# Patient Record
Sex: FEMALE | Race: WHITE | Employment: OTHER | URBAN - METROPOLITAN AREA
[De-identification: names, ages, dates, MRNs, and addresses within clinical notes are randomized per-mention and may not be internally consistent; named-entity substitution may affect disease eponyms.]

---

## 2017-02-09 ENCOUNTER — HOSPITAL ENCOUNTER (OUTPATIENT)
Dept: WOMENS IMAGING | Age: 77
Discharge: HOME | End: 2017-02-09
Payer: MEDICARE

## 2017-02-09 DIAGNOSIS — Z78.0 POST-MENOPAUSAL: ICD-10-CM

## 2017-02-09 PROCEDURE — 77080 DXA BONE DENSITY AXIAL: CPT

## 2025-01-21 ENCOUNTER — APPOINTMENT (OUTPATIENT)
Dept: RADIOLOGY | Facility: HOSPITAL | Age: 85
End: 2025-01-21
Payer: MEDICARE

## 2025-01-21 ENCOUNTER — HOSPITAL ENCOUNTER (EMERGENCY)
Facility: HOSPITAL | Age: 85
Discharge: HOME | End: 2025-01-21
Attending: EMERGENCY MEDICINE
Payer: MEDICARE

## 2025-01-21 ENCOUNTER — APPOINTMENT (OUTPATIENT)
Dept: CARDIOLOGY | Facility: HOSPITAL | Age: 85
End: 2025-01-21
Payer: MEDICARE

## 2025-01-21 VITALS
WEIGHT: 135 LBS | HEIGHT: 64 IN | TEMPERATURE: 96.8 F | RESPIRATION RATE: 17 BRPM | HEART RATE: 88 BPM | OXYGEN SATURATION: 94 % | SYSTOLIC BLOOD PRESSURE: 111 MMHG | DIASTOLIC BLOOD PRESSURE: 72 MMHG | BODY MASS INDEX: 23.05 KG/M2

## 2025-01-21 DIAGNOSIS — R55 SYNCOPE AND COLLAPSE: Primary | ICD-10-CM

## 2025-01-21 LAB
ALBUMIN SERPL BCP-MCNC: 4.2 G/DL (ref 3.4–5)
ALP SERPL-CCNC: 114 U/L (ref 33–136)
ALT SERPL W P-5'-P-CCNC: 11 U/L (ref 7–45)
ANION GAP SERPL CALC-SCNC: 15 MMOL/L (ref 10–20)
AST SERPL W P-5'-P-CCNC: 19 U/L (ref 9–39)
BASOPHILS # BLD AUTO: 0.02 X10*3/UL (ref 0–0.1)
BASOPHILS NFR BLD AUTO: 0.2 %
BILIRUB SERPL-MCNC: 0.9 MG/DL (ref 0–1.2)
BUN SERPL-MCNC: 16 MG/DL (ref 6–23)
CALCIUM SERPL-MCNC: 8.8 MG/DL (ref 8.6–10.3)
CARDIAC TROPONIN I PNL SERPL HS: 10 NG/L (ref 0–13)
CARDIAC TROPONIN I PNL SERPL HS: 11 NG/L (ref 0–13)
CHLORIDE SERPL-SCNC: 102 MMOL/L (ref 98–107)
CO2 SERPL-SCNC: 24 MMOL/L (ref 21–32)
CREAT SERPL-MCNC: 0.99 MG/DL (ref 0.5–1.05)
EGFRCR SERPLBLD CKD-EPI 2021: 56 ML/MIN/1.73M*2
EOSINOPHIL # BLD AUTO: 0 X10*3/UL (ref 0–0.4)
EOSINOPHIL NFR BLD AUTO: 0 %
ERYTHROCYTE [DISTWIDTH] IN BLOOD BY AUTOMATED COUNT: 14.8 % (ref 11.5–14.5)
GLUCOSE SERPL-MCNC: 166 MG/DL (ref 74–99)
HCT VFR BLD AUTO: 39.5 % (ref 36–46)
HGB BLD-MCNC: 13 G/DL (ref 12–16)
IMM GRANULOCYTES # BLD AUTO: 0.02 X10*3/UL (ref 0–0.5)
IMM GRANULOCYTES NFR BLD AUTO: 0.2 % (ref 0–0.9)
LYMPHOCYTES # BLD AUTO: 1 X10*3/UL (ref 0.8–3)
LYMPHOCYTES NFR BLD AUTO: 12.3 %
MAGNESIUM SERPL-MCNC: 2 MG/DL (ref 1.6–2.4)
MCH RBC QN AUTO: 29.4 PG (ref 26–34)
MCHC RBC AUTO-ENTMCNC: 32.9 G/DL (ref 32–36)
MCV RBC AUTO: 89 FL (ref 80–100)
MONOCYTES # BLD AUTO: 1 X10*3/UL (ref 0.05–0.8)
MONOCYTES NFR BLD AUTO: 12.3 %
NEUTROPHILS # BLD AUTO: 6.11 X10*3/UL (ref 1.6–5.5)
NEUTROPHILS NFR BLD AUTO: 75 %
NRBC BLD-RTO: 0 /100 WBCS (ref 0–0)
PLATELET # BLD AUTO: 182 X10*3/UL (ref 150–450)
POTASSIUM SERPL-SCNC: 3.8 MMOL/L (ref 3.5–5.3)
PROT SERPL-MCNC: 6.8 G/DL (ref 6.4–8.2)
RBC # BLD AUTO: 4.42 X10*6/UL (ref 4–5.2)
SODIUM SERPL-SCNC: 137 MMOL/L (ref 136–145)
WBC # BLD AUTO: 8.2 X10*3/UL (ref 4.4–11.3)

## 2025-01-21 PROCEDURE — 80053 COMPREHEN METABOLIC PANEL: CPT | Performed by: EMERGENCY MEDICINE

## 2025-01-21 PROCEDURE — 71045 X-RAY EXAM CHEST 1 VIEW: CPT

## 2025-01-21 PROCEDURE — 36415 COLL VENOUS BLD VENIPUNCTURE: CPT | Performed by: EMERGENCY MEDICINE

## 2025-01-21 PROCEDURE — 84484 ASSAY OF TROPONIN QUANT: CPT | Performed by: EMERGENCY MEDICINE

## 2025-01-21 PROCEDURE — 70450 CT HEAD/BRAIN W/O DYE: CPT

## 2025-01-21 PROCEDURE — 85025 COMPLETE CBC W/AUTO DIFF WBC: CPT | Performed by: EMERGENCY MEDICINE

## 2025-01-21 PROCEDURE — 70450 CT HEAD/BRAIN W/O DYE: CPT | Performed by: RADIOLOGY

## 2025-01-21 PROCEDURE — 99285 EMERGENCY DEPT VISIT HI MDM: CPT | Mod: 25 | Performed by: EMERGENCY MEDICINE

## 2025-01-21 PROCEDURE — 83735 ASSAY OF MAGNESIUM: CPT | Performed by: EMERGENCY MEDICINE

## 2025-01-21 PROCEDURE — 93005 ELECTROCARDIOGRAM TRACING: CPT

## 2025-01-21 PROCEDURE — 71045 X-RAY EXAM CHEST 1 VIEW: CPT | Performed by: RADIOLOGY

## 2025-01-21 ASSESSMENT — PAIN - FUNCTIONAL ASSESSMENT: PAIN_FUNCTIONAL_ASSESSMENT: 0-10

## 2025-01-21 ASSESSMENT — PAIN SCALES - GENERAL
PAINLEVEL_OUTOF10: 0 - NO PAIN
PAINLEVEL_OUTOF10: 0 - NO PAIN

## 2025-01-21 ASSESSMENT — LIFESTYLE VARIABLES
EVER FELT BAD OR GUILTY ABOUT YOUR DRINKING: NO
EVER HAD A DRINK FIRST THING IN THE MORNING TO STEADY YOUR NERVES TO GET RID OF A HANGOVER: NO
TOTAL SCORE: 0
HAVE YOU EVER FELT YOU SHOULD CUT DOWN ON YOUR DRINKING: NO
HAVE PEOPLE ANNOYED YOU BY CRITICIZING YOUR DRINKING: NO

## 2025-01-22 LAB
ATRIAL RATE: 178 BPM
P AXIS: 48 DEGREES
P OFFSET: 186 MS
P ONSET: 143 MS
Q ONSET: 221 MS
QRS COUNT: 11 BEATS
QRS DURATION: 64 MS
QT INTERVAL: 368 MS
QTC CALCULATION(BAZETT): 391 MS
QTC FREDERICIA: 383 MS
R AXIS: 10 DEGREES
T AXIS: 36 DEGREES
T OFFSET: 405 MS
VENTRICULAR RATE: 68 BPM

## 2025-01-22 NOTE — ED PROVIDER NOTES
HPI   Chief Complaint   Patient presents with    Syncope       Patient is a 84-year-old female with history of Alzheimer's dementia syncope comes in at this time by EMS complaining of having passed out on the toilet the history is obtained from the EMS as patient is a male dementia and does not know why she is here EMS also stated that the daughter saw the patient on the toilet passed out and she thinks he probably had a seizure but as per EMS when they got the patient is awake and alert back at baseline and not postictal at all.              Patient History   No past medical history on file.  Past Surgical History:   Procedure Laterality Date    US ASPIRATION INJECTION INTERMEDIATE JOINT  10/28/2021    US ASPIRATION INJECTION INTERMEDIATE JOINT 10/28/2021 [facility]     No family history on file.  Social History     Tobacco Use    Smoking status: Not on file    Smokeless tobacco: Not on file   Substance Use Topics    Alcohol use: Not on file    Drug use: Not on file       Physical Exam   ED Triage Vitals   Temperature Heart Rate Respirations BP   01/21/25 2029 01/21/25 2029 01/21/25 2029 01/21/25 2034   36 °C (96.8 °F) 69 20 (!) 103/43      Pulse Ox Temp Source Heart Rate Source Patient Position   01/21/25 2029 01/21/25 2029 01/21/25 2029 01/21/25 2034   94 % Temporal Monitor Lying      BP Location FiO2 (%)     01/21/25 2034 --     Left arm        Physical Exam  Vitals reviewed.   Constitutional:       Appearance: Normal appearance.   HENT:      Head: Normocephalic.   Cardiovascular:      Rate and Rhythm: Normal rate and regular rhythm.   Pulmonary:      Effort: Pulmonary effort is normal.   Abdominal:      Palpations: Abdomen is soft.   Skin:     General: Skin is warm.   Neurological:      General: No focal deficit present.      Mental Status: She is alert. Mental status is at baseline.           ED Course & MDM   Diagnoses as of 01/21/25 2230   Syncope and collapse                 No data recorded      Mason Coma Scale Score: 14 (01/21/25 2034 : Lo Townsend RN)                           Medical Decision Making  EKG interpreted by me shows sinus rhythm rate of 68 normal QRS and normal ST segments  My differential diagnosis  Acute electrolyte imbalance acute arrhythmia acute seizure acute stroke  I ordered CBC chemistries cardiac labs and a CT scan of the head further workup and management or return based upon results of this obtained.  Also order orthostatic vital signs which were normal  Patient CBC chemistries were unremarkable troponin was negative EKG was normal CT scan of the head and chest x-ray were all unremarkable patient has been back to her baseline ever since EMS got to the house at this [facility] at bedside patient is conversing awake alert at baseline labs were discussed with the daughter and at this time reassured and discharged to home  Labs Reviewed  CBC WITH AUTO DIFFERENTIAL - Abnormal     WBC                           8.2                    nRBC                          0.0                    RBC                           4.42                   Hemoglobin                    13.0                   Hematocrit                    39.5                   MCV                           89                     MCH                           29.4                   MCHC                          32.9                   RDW                           14.8 (*)               Platelets                     182                    Neutrophils %                 75.0                   Immature Granulocytes %, Automated   0.2                    Lymphocytes %                 12.3                   Monocytes %                   12.3                   Eosinophils %                 0.0                    Basophils %                   0.2                    Neutrophils Absolute          6.11 (*)               Immature Granulocytes Absolute, Au*   0.02                   Lymphocytes Absolute          1.00                    Monocytes Absolute            1.00 (*)               Eosinophils Absolute          0.00                   Basophils Absolute            0.02                COMPREHENSIVE METABOLIC PANEL - Abnormal     Glucose                       166 (*)                Sodium                        137                    Potassium                     3.8                    Chloride                      102                    Bicarbonate                   24                     Anion Gap                     15                     Urea Nitrogen                 16                     Creatinine                    0.99                   eGFR                          56 (*)                 Calcium                       8.8                    Albumin                       4.2                    Alkaline Phosphatase          114                    Total Protein                 6.8                    AST                           19                     Bilirubin, Total              0.9                    ALT                           11                  MAGNESIUM - Normal     Magnesium                     2.00                SERIAL TROPONIN-INITIAL - Normal     Troponin I, High Sensitivity   10                       Narrative: Less than 99th percentile of normal range cutoff-                Female and children under 18 years old <14 ng/L; Male <21 ng/L: Negative                Repeat testing should be performed if clinically indicated.                                 Female and children under 18 years old 14-50 ng/L; Male 21-50 ng/L:                Consistent with possible cardiac damage and possible increased clinical                 risk. Serial measurements may help to assess extent of myocardial damage.                                 >50 ng/L: Consistent with cardiac damage, increased clinical risk and                myocardial infarction. Serial measurements may help assess extent of                 myocardial damage.                                   NOTE: Children less than 1 year old may have higher baseline troponin                 levels and results should be interpreted in conjunction with the overall                 clinical context.                                 NOTE: Troponin I testing is performed using a different                 testing methodology at [facility] than at other                 [facility]. Direct result comparisons should only                 be made within the same method.  SERIAL TROPONIN, 1 HOUR - Normal     Troponin I, High Sensitivity   11                       Narrative: Less than 99th percentile of normal range cutoff-                Female and children under 18 years old <14 ng/L; Male <21 ng/L: Negative                Repeat testing should be performed if clinically indicated.                                 Female and children under 18 years old 14-50 ng/L; Male 21-50 ng/L:                Consistent with possible cardiac damage and possible increased clinical                 risk. Serial measurements may help to assess extent of myocardial damage.                                 >50 ng/L: Consistent with cardiac damage, increased clinical risk and                myocardial infarction. Serial measurements may help assess extent of                 myocardial damage.                                  NOTE: Children less than 1 year old may have higher baseline troponin                 levels and results should be interpreted in conjunction with the overall                 clinical context.                                 NOTE: Troponin I testing is performed using a different                 testing methodology at [facility] than at other                 [facility]. Direct result comparisons should only                 be made within the same method.  TROPONIN SERIES- (INITIAL, 1 HR)     CT head wo IV contrast   Final Result    No acute intracranial abnormality.           Similar-appearing chronic small-vessel white matter ischemic changes.          MACRO:    None.          Signed by: Sunny Gonzalez 1/21/2025 10:19 PM    Dictation workstation:   [id number]     XR chest 1 view   Final Result    1. No acute cardiopulmonary process.          MACRO:    None.          Signed by: Vi Alvarez 1/21/2025 9:38 PM    Dictation workstation:   [id number]                Procedure  Procedures     Cyrus Kohli MD  01/21/25 6097

## 2025-03-21 ENCOUNTER — APPOINTMENT (OUTPATIENT)
Dept: CARDIOLOGY | Facility: CLINIC | Age: 85
End: 2025-03-21
Payer: MEDICARE

## 2025-03-21 VITALS
HEIGHT: 63 IN | HEART RATE: 55 BPM | DIASTOLIC BLOOD PRESSURE: 60 MMHG | WEIGHT: 135 LBS | BODY MASS INDEX: 23.92 KG/M2 | SYSTOLIC BLOOD PRESSURE: 102 MMHG

## 2025-03-21 DIAGNOSIS — R01.1 MURMUR, HEART: ICD-10-CM

## 2025-03-21 DIAGNOSIS — R94.31 ABNORMAL EKG: ICD-10-CM

## 2025-03-21 DIAGNOSIS — Z76.89 ENCOUNTER TO ESTABLISH CARE WITH NEW DOCTOR: ICD-10-CM

## 2025-03-21 DIAGNOSIS — R55 NEAR SYNCOPE: ICD-10-CM

## 2025-03-21 DIAGNOSIS — I10 ESSENTIAL HYPERTENSION: ICD-10-CM

## 2025-03-21 DIAGNOSIS — E78.2 MIXED HYPERLIPIDEMIA: ICD-10-CM

## 2025-03-21 DIAGNOSIS — R00.1 BRADYCARDIA: ICD-10-CM

## 2025-03-21 DIAGNOSIS — Z78.9 NEVER SMOKED CIGARETTES: ICD-10-CM

## 2025-03-21 PROBLEM — G30.9 MIXED ALZHEIMER'S AND VASCULAR DEMENTIA (MULTI): Status: ACTIVE | Noted: 2021-04-22

## 2025-03-21 PROBLEM — F01.50 MIXED ALZHEIMER'S AND VASCULAR DEMENTIA (MULTI): Status: ACTIVE | Noted: 2021-04-22

## 2025-03-21 PROBLEM — E11.9 DIABETES MELLITUS (MULTI): Status: ACTIVE | Noted: 2023-03-08

## 2025-03-21 PROBLEM — Z85.3 HX: BREAST CANCER: Status: ACTIVE | Noted: 2018-08-10

## 2025-03-21 PROBLEM — Z98.890 HISTORY OF MOHS MICROGRAPHIC SURGERY FOR SKIN CANCER: Status: ACTIVE | Noted: 2017-03-06

## 2025-03-21 PROBLEM — F02.80 MIXED ALZHEIMER'S AND VASCULAR DEMENTIA (MULTI): Status: ACTIVE | Noted: 2021-04-22

## 2025-03-21 PROBLEM — Z85.828 HISTORY OF MOHS MICROGRAPHIC SURGERY FOR SKIN CANCER: Status: ACTIVE | Noted: 2017-03-06

## 2025-03-21 PROCEDURE — 3074F SYST BP LT 130 MM HG: CPT | Performed by: INTERNAL MEDICINE

## 2025-03-21 PROCEDURE — 93000 ELECTROCARDIOGRAM COMPLETE: CPT | Performed by: INTERNAL MEDICINE

## 2025-03-21 PROCEDURE — 99204 OFFICE O/P NEW MOD 45 MIN: CPT | Performed by: INTERNAL MEDICINE

## 2025-03-21 PROCEDURE — 3078F DIAST BP <80 MM HG: CPT | Performed by: INTERNAL MEDICINE

## 2025-03-21 PROCEDURE — 1036F TOBACCO NON-USER: CPT | Performed by: INTERNAL MEDICINE

## 2025-03-21 PROCEDURE — 1159F MED LIST DOCD IN RCRD: CPT | Performed by: INTERNAL MEDICINE

## 2025-03-21 RX ORDER — NAPROXEN 500 MG/1
TABLET ORAL
COMMUNITY
Start: 2021-07-01

## 2025-03-21 RX ORDER — DONEPEZIL HYDROCHLORIDE 5 MG/1
5 TABLET, FILM COATED ORAL 2 TIMES DAILY
COMMUNITY
Start: 2021-02-23

## 2025-03-21 RX ORDER — ERGOCALCIFEROL 1.25 MG/1
1.25 CAPSULE ORAL WEEKLY
COMMUNITY

## 2025-03-21 RX ORDER — ATORVASTATIN CALCIUM 80 MG/1
80 TABLET, FILM COATED ORAL DAILY
COMMUNITY
Start: 2025-02-28

## 2025-03-21 RX ORDER — LANOLIN ALCOHOL/MO/W.PET/CERES
1000 CREAM (GRAM) TOPICAL DAILY
COMMUNITY

## 2025-03-21 RX ORDER — AMLODIPINE BESYLATE 5 MG/1
5 TABLET ORAL
COMMUNITY
Start: 2021-08-08

## 2025-03-21 RX ORDER — ALENDRONATE SODIUM 70 MG/1
70 TABLET ORAL
COMMUNITY
Start: 2025-01-21

## 2025-03-21 RX ORDER — CETIRIZINE HYDROCHLORIDE 10 MG/1
10 TABLET ORAL
COMMUNITY
Start: 2024-11-12

## 2025-03-21 RX ORDER — EPINEPHRINE 0.3 MG/.3ML
0.3 INJECTION SUBCUTANEOUS AS NEEDED
COMMUNITY
Start: 2025-02-28

## 2025-03-21 RX ORDER — MEMANTINE HYDROCHLORIDE 10 MG/1
10 TABLET ORAL 2 TIMES DAILY
COMMUNITY
Start: 2021-05-21

## 2025-03-21 RX ORDER — ASPIRIN 81 MG/1
81 TABLET ORAL DAILY
COMMUNITY

## 2025-03-21 RX ORDER — BISMUTH SUBSALICYLATE 262 MG
1 TABLET,CHEWABLE ORAL DAILY
COMMUNITY

## 2025-03-21 RX ORDER — TRETINOIN 1 MG/G
CREAM TOPICAL NIGHTLY
COMMUNITY

## 2025-03-21 NOTE — PATIENT INSTRUCTIONS
You will be scheduled for an echocardiogram and a 7 day Ziopatch.  Will call patient with test results once reviewed by physician   Follow up as needed    Continue same medications/treatment.  Patient educated on proper medication use.  Patient educated on risk factor modification.  Please bring any lab results from other providers / physicians to your next appointment.    Please bring all medicines, vitamins and herbal supplements with you when you come to the office.    Prescriptions will not be filled unless you are compliant with your follow up appointments or have a follow up  appointment scheduled as per instruction of your physician.  Refills should be requested at the time of  Your visit.

## 2025-03-21 NOTE — PROGRESS NOTES
CARDIOLOGY OFFICE VISIT      CHIEF COMPLAINT  Chief Complaint   Patient presents with    New Patient Visit     For cardiac evaluation due to recent episode of syncope        HISTORY OF PRESENT ILLNESS  The patient is being seen today because of episodes she has had starting about 3 years ago.  She has had 3 episodes where her she is just doing okay then she cannot seem to do anything.  She cannot walk.  She has a stair in her eyes.  She does not talk.  She does not lose consciousness.  She has not fainted.  She has not had any definite seizure activity.  She has not become incontinent of urine or stool.  She has not bitten her tongue.  She was hospitalized on the first 2 episodes and no definite etiology was found.  The last episode occurred the end of January.  She was taken to the emergency room at [facility] and she was sent home.  She did have a CT scan of the brain which was reported to show no acute intracranial abnormality and chronic small vessel white matter ischemic changes.  She does see a neurologist for dementia.  The patient's daughter is uncertain whether she ever saw neurology in the 2 times she was hospitalized for this problem.  I told her that next time she sees the neurologist she could talk to them about evaluating her for any type of seizure disorder.  She usually sees the nurse practitioner.  She does not have any past history of cardiac pathology.  She denies chest discomfort.  She denies dyspnea.  She denies palpitations or any definite syncope.  She does not smoke cigarettes.  She does have a history of hypertension and hyperlipidemia under treatment.  She does not have any history of diabetes melitis.  She does not have any family history of significant cardiac pathology.  She denies any problem with her current medication    EKG: Sinus bradycardia, 55 bpm, otherwise within normal limits, results discussed with patient    Impression:  Dementia  Hypertension  Hyperlipidemia  Never a  smoker  Bradycardia  Etiology of 3 episodes noted above that occurred over the last 3 years on certain, question seizure activity, question some sort of cardiac arrhythmia    Plan:  7-day Zio patch  Echocardiogram    Please excuse any errors in grammar or translation related to this dictation.  Voice recognition software was utilized to prepare this document.    Past Medical History  No past medical history on file.    Social History  Social History     Tobacco Use    Smoking status: Never    Smokeless tobacco: Never   Substance Use Topics    Alcohol use: Not Currently    Drug use: Not Currently       Family History     Family History   Problem Relation Name Age of Onset    Cancer Mother      Heart failure Father          Allergies:  Allergies   Allergen Reactions    Bee Venom Protein (Honey Bee) Anaphylaxis    Erythromycin Unknown        Outpatient Medications:  Current Outpatient Medications   Medication Instructions    alendronate (FOSAMAX) 70 mg, Every 7 days    amLODIPine (NORVASC) 5 mg, Daily RT    aspirin 81 mg, Daily    atorvastatin (LIPITOR) 80 mg, Daily    cetirizine (ZYRTEC) 10 mg, Daily RT    cyanocobalamin (VITAMIN B-12) 1,000 mcg, Daily    donepezil (ARICEPT) 5 mg, 2 times daily    EPINEPHrine (EPIPEN) 0.3 mg, As needed    ergocalciferol (VITAMIN D2) 1.25 mg, Weekly    memantine (NAMENDA) 10 mg, 2 times daily    multivitamin tablet 1 tablet, Daily    naproxen (Naprosyn) 500 mg tablet 2 times daily (morning and late afternoon)    tretinoin (Retin-A) 0.1 % cream Nightly    vit A/vit C/vit E/zinc/copper (ICAPS AREDS ORAL) 1 tablet, 2 times daily          REVIEW OF SYSTEMS  Review of Systems   All other systems reviewed and are negative.        VITALS  Vitals:    03/21/25 1239   BP: 102/60   Pulse: 55       PHYSICAL EXAM  Vitals and nursing note reviewed.   Constitutional:       Appearance: Healthy appearance.   Eyes:      Conjunctiva/sclera: Conjunctivae normal.      Pupils: Pupils are equal, round, and  reactive to light.   Pulmonary:      Effort: Pulmonary effort is normal.      Breath sounds: Normal breath sounds.   Cardiovascular:      PMI at left midclavicular line. Normal rate. Regular rhythm.      Murmurs: There is a grade 1/6 systolic murmur at the apex.      No gallop.  No click. No rub.   Pulses:     Intact distal pulses.   Edema:     Peripheral edema absent.   Musculoskeletal: Normal range of motion. Skin:     General: Skin is warm and dry.   Neurological:      Mental Status: Alert and oriented to person, place and time.           ASSESSMENT AND PLAN  Diagnoses and all orders for this visit:  Abnormal EKG  Encounter to establish care with new doctor  Murmur, heart  Near syncope  Mixed hyperlipidemia  Essential hypertension  BMI 23.0-23.9, adult  Never smoked cigarettes  Bradycardia      [unfilled]      I,Berenice Bardales LPN am scribing for, and in the presence of Dr. Endy Lees.    I, Dr. Endy Lees, personally performed the services described in the documentation as scribed by Berenice Bardales LPN in my presence, and confirm it is both accurate and complete.      Dr. Endy Baker MD  Thank you for allowing me to participate in the care of this patient. Please do not hesitate to contact me with any further questions or concerns.

## 2025-04-07 ENCOUNTER — APPOINTMENT (OUTPATIENT)
Dept: CARDIOLOGY | Facility: CLINIC | Age: 85
End: 2025-04-07
Payer: MEDICARE

## 2025-04-07 ENCOUNTER — HOSPITAL ENCOUNTER (OUTPATIENT)
Dept: CARDIOLOGY | Facility: CLINIC | Age: 85
Discharge: HOME | End: 2025-04-07
Payer: MEDICARE

## 2025-04-07 DIAGNOSIS — R00.1 BRADYCARDIA: ICD-10-CM

## 2025-04-07 DIAGNOSIS — R55 NEAR SYNCOPE: ICD-10-CM

## 2025-04-07 DIAGNOSIS — R01.1 MURMUR, HEART: ICD-10-CM

## 2025-04-07 LAB
AORTIC VALVE MEAN GRADIENT: 4 MMHG
AORTIC VALVE PEAK VELOCITY: 1.26 M/S
AV PEAK GRADIENT: 6 MMHG
AVA (PEAK VEL): 2.57 CM2
AVA (VTI): 2.8 CM2
EJECTION FRACTION APICAL 4 CHAMBER: 54.5
EJECTION FRACTION: 63 %
LEFT VENTRICLE INTERNAL DIMENSION DIASTOLE: 3.7 CM (ref 3.5–6)
LEFT VENTRICULAR OUTFLOW TRACT DIAMETER: 1.9 CM
LV EJECTION FRACTION BIPLANE: 55 %
MITRAL VALVE E/A RATIO: 1.01
MITRAL VALVE E/E' RATIO: 13.6
RIGHT VENTRICLE PEAK SYSTOLIC PRESSURE: 26.6 MMHG

## 2025-04-07 PROCEDURE — 93306 TTE W/DOPPLER COMPLETE: CPT | Performed by: INTERNAL MEDICINE

## 2025-04-07 PROCEDURE — 93306 TTE W/DOPPLER COMPLETE: CPT

## 2025-04-08 ENCOUNTER — TELEPHONE (OUTPATIENT)
Dept: CARDIOLOGY | Facility: CLINIC | Age: 85
End: 2025-04-08
Payer: MEDICARE

## 2025-04-08 NOTE — TELEPHONE ENCOUNTER
----- Message from Nurse Berenice MONACO sent at 4/8/2025  9:14 AM EDT -----    ----- Message -----  From: Endy Lees MD  Sent: 4/8/2025   8:40 AM EDT  To: Berenice Bardales LPN    Echocardiogram demonstrates normal left ventricular systolic function and no significant valvular abnormalities  ----- Message -----  From: Corina, Syngo - Cardiology Results In  Sent: 4/7/2025   6:49 PM EDT  To: Endy Lees MD

## 2025-06-04 ENCOUNTER — DOCUMENTATION (OUTPATIENT)
Dept: CARDIOLOGY | Facility: CLINIC | Age: 85
End: 2025-06-04
Payer: MEDICARE